# Patient Record
Sex: FEMALE | URBAN - METROPOLITAN AREA
[De-identification: names, ages, dates, MRNs, and addresses within clinical notes are randomized per-mention and may not be internally consistent; named-entity substitution may affect disease eponyms.]

---

## 2020-04-20 ENCOUNTER — APPOINTMENT (RX ONLY)
Dept: URBAN - METROPOLITAN AREA CLINIC 93 | Facility: CLINIC | Age: 25
Setting detail: DERMATOLOGY
End: 2020-04-20

## 2020-04-20 DIAGNOSIS — Z02.9 ENCOUNTER FOR ADMINISTRATIVE EXAMINATIONS, UNSPECIFIED: ICD-10-CM

## 2020-04-20 PROCEDURE — ? ADDITIONAL NOTES

## 2020-04-20 NOTE — PROCEDURE: ADDITIONAL NOTES
Detail Level: Simple
Additional Notes: Patient was not present for her telehealth visit today through [employer] audio and video chat or [email or web address]. Patient was emailed yesterday with instructions for today’s visit. Patient was called and a voice message was left.

## 2020-08-31 ENCOUNTER — APPOINTMENT (RX ONLY)
Dept: URBAN - METROPOLITAN AREA CLINIC 93 | Facility: CLINIC | Age: 25
Setting detail: DERMATOLOGY
End: 2020-08-31

## 2020-08-31 DIAGNOSIS — L73.2 HIDRADENITIS SUPPURATIVA: ICD-10-CM

## 2020-08-31 PROCEDURE — ? COUNSELING

## 2020-08-31 PROCEDURE — ? PRESCRIPTION

## 2020-08-31 PROCEDURE — ? RECOMMENDATIONS

## 2020-08-31 PROCEDURE — 99213 OFFICE O/P EST LOW 20 MIN: CPT

## 2020-08-31 RX ORDER — CEPHALEXIN 500 MG/1
TABLET ORAL BID
Qty: 60 | Refills: 3 | Status: ERX | COMMUNITY
Start: 2020-08-31

## 2020-08-31 RX ORDER — SULFACETAMIDE SODIUM 98 MG/G
LOTION TOPICAL
Qty: 1 | Refills: 1 | Status: ACTIVE

## 2020-08-31 RX ORDER — CLINDAMYCIN PHOSPHATE 10 MG/ML
SOLUTION TOPICAL
Qty: 1 | Refills: 3 | Status: ERX | COMMUNITY
Start: 2020-08-31

## 2020-08-31 RX ORDER — CEPHALEXIN 500 MG/1
CAPSULE ORAL
Qty: 90 | Refills: 1 | Status: ERX | COMMUNITY
Start: 2020-08-31

## 2020-08-31 RX ADMIN — CEPHALEXIN: 500 TABLET ORAL at 00:00

## 2020-08-31 RX ADMIN — CEPHALEXIN: 500 CAPSULE ORAL at 00:00

## 2020-08-31 RX ADMIN — CLINDAMYCIN PHOSPHATE: 10 SOLUTION TOPICAL at 00:00

## 2020-08-31 NOTE — PROCEDURE: RECOMMENDATIONS
Recommendation Preamble: The following recommendations were made during the visit: recommend 10/2 Wash
Detail Level: Zone

## 2021-08-10 ENCOUNTER — RX ONLY (OUTPATIENT)
Age: 26
Setting detail: RX ONLY
End: 2021-08-10

## 2021-08-10 RX ORDER — CEPHALEXIN 500 MG/1
TABLET ORAL BID
Qty: 60 | Refills: 2 | Status: ERX | COMMUNITY
Start: 2021-08-10